# Patient Record
Sex: FEMALE
[De-identification: names, ages, dates, MRNs, and addresses within clinical notes are randomized per-mention and may not be internally consistent; named-entity substitution may affect disease eponyms.]

---

## 2019-04-10 ENCOUNTER — HOSPITAL ENCOUNTER (INPATIENT)
Dept: HOSPITAL 80 - BBEH | Age: 19
LOS: 2 days | Discharge: HOME | DRG: 882 | End: 2019-04-12
Attending: NURSE PRACTITIONER | Admitting: NURSE PRACTITIONER
Payer: COMMERCIAL

## 2019-04-10 DIAGNOSIS — F43.25: Primary | ICD-10-CM

## 2019-04-11 NOTE — ASMTBHMTP
Master Treatment Plan

 

Master Treatment Plan         Answers:  Depressed Mood with                   

for:                                    Suicidal Ideation                     

Date:                         04/11/2019

Diagnosis on Admission:       Borderline personality disorder

Expected length of stay:      3-5

Reason for admission:         

Notes:

Per [facility] admission information 4/7/2019: " The pt. is a 18 y.o. female who was 

admitted from [place] ED for acute delirium, hallucinations and anticholinergic side effects 

from intentional Benadryl overdose".

Patient's stated              

presenting problems:          

Notes:

"On Fri. I took 2500mg of Benadryl because I was fighting with my mother and boyfriend. I was 

stupid. It was impulsive".

Patient's goals for           

treatment:                    

Notes:

"Stay on the unit for 7 days, makes me think about how my actions affect the people around me".

Patient's strengths:          

Notes:

"I'm loving".

Identify supports outside     

of hospital:                  

Notes:

"My family, mom, dad, BF".

Discharge criteria:           

Notes:

Suicidal ideation will resolve and patient will have a plan to safely manage recurrent SI.

Initial disposition           

plan/considerations:          

Notes:

Participate in unit activities. Schedule follow up appointment with therapist.

Master Treatment Plan Required Signatures

 

Psychiatrist signature:       Answers:  ERIN Mark: __________________________

RN on-shift signature:        Answers:  RN: ____________________________________

Patient signature:            Answers:  Patient: ____________________________________

 

Date Signed:  04/11/2019 08:54 AM

Electronically Signed By:Tammie Murdock

## 2019-04-11 NOTE — ASMTCMCOM
CM Note

 

CM Note                       

Notes:

CC met with ct. to develop MTP. Ct. presented as pleasant, cooperative and cheerful. She reported 

that her SA was impulsive and that she took the Benadryl after fighting with MOC and BF. Ct. was 

remorseful and said that it was "a stupid act". She denied being suicidal. She signed a SRINATH for her 


therapist Debbie Arce.

 

Date Signed:  04/11/2019 11:29 AM

Electronically Signed By:Tammie Murdock

## 2019-04-11 NOTE — PDCONSULT
Consultant Note: 





INTERNAL MEDICINE CONSULT NOTE





DATE OF CONSULTATION: 4/11/2019





REASON FOR CONSULTATION: medical clearance for inpatient behavioral health stay





HISTORY OF PRESENT ILLNESS: Mrs Khan is a 19yo F who I was asked to see in the 

inpatient behavioral health unit for medical clearance. She was transferred to 

this facility from [facility] after an intentional overdose with 

benadryl. This was a suicide attempt. She was encephalopathic and had a 

anticholinergic toxidrome. Poison control was consulted who recommended 

conservative management. She improved and was stable for transfer here. She was 

reportedly having bizarre behavior (grandiose thought, stealing credit cards) 

recently, prompting her mother to bring her home from college. On my evaluation

, she is pleasant and without complaints. No recent fevers, chills, chest pain, 

dyspnea, abdominal pain, n/v/d, rashes. No recent medication changes or 

additions.





PAST MEDICAL HISTORY: none


PAST SURGICAL HISTORY: none


MEDICATIONS: none


ALLERGIES: nkda


SOCIAL HISTORY: Born in [place]. Lives with mother and stepfather. Has a sister. 

Smokes marijuana regularly. No alcohol. Occasional vape pen. Denies other 

illicits.


FAMILY HISTORY: non-contributory





REVIEW OF SYSTEMS: 10 point review was negative except per HPI.





VITALS: Reviewed. Afebrile, normal HR and BP.





PHYSICAL EXAM: No acute distress, comfortable. Appears stated age. Anicteric 

sclera, eomi, perrl. No thyromegaly. Clear oropharynx. RRR, no murmurs. Lungs 

clear bilaterally. Abdomen soft and nontender. No rashes. No leg edema or JVD. 

CN 2-12 intact, moving all extremities. Appropriate.





LABORATORY STUDIES: Beta-HCG negative. Reviewed labs from [facility] - negative 

tylenol level, negative salicylates, negative Utox. Serum creatinine 0.82. 

Initial labs at outside facility showed a potassium of 3.0, bicarbonate of 18, 

anion gap of 22, normal LFTs, WBC 11.4, hct 45.3, plts 415. ECG showed sinus 

tachycardia with a QTc of 434. 





ASSESSMENT/PLAN:


1. Psychiatric issues, suicide attempt, concern for borderline personality 

disorder: Management per psychiatry team.


2. Intentional diphenhydramine overdose with anticholinergic toxicity: Ingested 

prior to 4/7. Half life of benadryl is 8-10 hours so this should be out of 

system. Currently she does not have a toxidrome. She is urinating spontaneously 

on her own.


3. Anion gap metabolic acidosis: Due to acute intoxication. Resolved.


4. Hypokalemia: Resolved. She does not required regular blood draws to monitor 

her electrolytes unless there is concern for minimal PO intake.  





I see no medical contraindications for Ms Khan's continued stay in the inpatient 

behavioral health unit.





Thank you for this consult. Please do not hesitate to contact the internal 

medicine/hospitalist team if there should be further need for medical 

evaluation.

## 2019-04-11 NOTE — BAPA
[f 
rep st]



                                                  ADMISSION PSYCHIATRIC 
ASSESSMENT





DATE OF SERVICE:  04/11/2019



CHIEF COMPLAINT:  "Either Friday or Saturday I took 2500 mg of Benadryl due to 
a fight I had with my mom and my boyfriend."



HISTORY OF PRESENT ILLNESS:  The patient admitted involuntarily and is on an M1 
hold due to being a danger to herself, hospitalized for safety, crisis 
stabilization, and medication evaluation.  The patient describes circumstances 
led to current hospitalization as overdosing on Benadryl after getting in a 
fight with her boyfriend and her mother.  The patient reports no history of 
mental health illness or treatment.  The patient reports using no alcohol or 
substances prior to her overdose suicide attempt.  The patient describes no 
current psychiatric symptoms.  The patient reports arguing with her mom and 
boyfriend lead her to mood instability and conduct, and reports that she no 
longer has these feelings.  The patient reports this has been a "wake-up call" 
for her.  The patient reports no current psychiatric symptoms.  The patient 
denies psychiatric symptoms including symptoms of depression, lise, anxiety, 
ADHD, OCD, PTSD, psychosis, and any other symptom of psychiatric disorder.  The 
patient reports no history of abuse or trauma.  The patient describes attending 
to household responsibilities without difficulty.  Reports she is currently 
unemployed and plans to become a full-time student this fall.  The patient 
reports she does have a close group of friends and reports she gets along well 
with her parents.  The patient reports hobby as shopping.  The patient states 
she is generally satisfied with her life.  The patient denies current suicidal 
ideation, reports protective factors or reasons to live as her family, friends, 
and future.  The patient reports a future goal is to obtain a degree in finance 
or to do something that helps people.  The patient reports support network as 
family and friends.  Patient denies current homicidal ideation and denies 
current self-injurious ideation.  The patient was most recently established for 
therapy with Dr. Debbie Arce.  The patient reports she goes to Urgent Care for 
primary care needs.



PAST PSYCHIATRIC HISTORY:  The patient reports no past history of diagnoses and 
reports past psychotropic medication trials of trazodone and Adderall.  The 
patient reports she no longer takes these medications.  The patient reports no 
history of inpatient psychiatric treatment.  The patient describes no history 
of withdrawal from drugs or alcohol, and reports no history of suicide attempts
, and no history of self-injurious behavior.



ALLERGIES:  No known allergies.



CURRENT MEDICATIONS:  

1.  Tylenol 650 mg p.o. q.4 hours p.r.n. 

2.  Maalox syrup 30 mL p.o. q.6 hours p.r.n.

3.  Milk of magnesia 30 mL p.o. q. day. p.r.n.



PAST MEDICAL HISTORY:  The patient reports she has no reason to believe she 
could be pregnant, is currently not on birth control.  Reports she and her 
boyfriend do use protection.  The patient's urine pregnancy test at time of 
admission is negative.  The patient reports no history of neurological 
conditions including organic brain disease, traumatic brain injury, and 
concussions.  The patient reports no history of major illnesses or major 
hospitalizations.



SOCIAL HISTORY:  The patient reports that she was born in [place] and raised the 
majority of her life in [place] by her mom and step dad.  The patient reports 
she currently resides in [place], [place], with her mom and step dad.  The 
patient describes meeting all her developmental milestones.  Reports no history 
of learning delays or difficulties.  The patient describes her sexual 
orientation as heterosexual and reports she has been with her boyfriend for 2 
years.  The patient states she has never been , has no children, and is 
currently unemployed.  The patient's highest level of education is high school.
  The patient reports no history of  duty.  Describes Episcopal or 
spiritual practice as Tenriism, and reports no history of current legal 
charges or problems.



SUBSTANCE USE HISTORY:  Patient reports she drinks about once a month and 
drinks 1 drink per occasion.  The patient states that she uses a Juul multiple 
times per day.  The patient describes no other substance use history.



FAMILY PSYCHIATRIC HISTORY:  The patient reports no family history of mental 
illness, no family history of suicide, and no family history of substance use.



ADMISSION LABS AND STUDIES:  The patient's urine pregnancy test negative.



MENTAL STATUS EXAM:  The patient presents casually dressed and with good hygiene
, and looks stated age.  Patient is sitting, posture is upright, and position 
is relaxed.  Patient appears awake, alert, and responds appropriately and 
reasonably during interview.  Patient is engaged, relates well to interviewer, 
and emotional facial expression is appropriate to situation and changes 
appropriately with topic.  Patient is cooperative, makes comfortable eye contact
, and movements are voluntary, deliberate, coordinated, and smooth and even 
with no inappropriate movements.  Patient makes laryngeal sounds effortlessly 
and shares conversation appropriately; pace of conversation is appropriate, and 
stream of talking is fluent; articulation is clear and understandable; word 
choice is effortless and appropriate for education level; completes sentences, 
occasionally pausing to think; rate and volume are appropriate for interview 
and setting.  Patient reports mood as euthymic.  Patients affect is stable with 
full variable range, congruent with mood, and appropriate to speech and 
circumstances.  Patient has linear and logical thinking, with no loose 
associations, tangential thought, thought blocking, concrete thinking, or any 
other signs of formal thought disorder.  Patient denies suicidal and homicidal 
ideation, and denies hallucinations and delusions.  Patient appears to be a 
reliable historian with sound judgement and good insight into current 
condition.  Patient has no apparent dysfunction in recent or remote memory noted
, and no evidence of gross cognitive dysfunction noted at any point during the 
interview.



DIAGNOSIS:  Based on the patient's history and current presentation, patient's 
diagnosis is adjustment disorder with mixed disturbance of emotions and conduct.



FORMULATION:  The patient is a 18-year-old female, single, not , is 
currently in a relationship with a boyfriend of 2 years.  Reports she is 
currently unemployed and plans to go to school this fall.  The patient is 
currently living with her parents in [place], [place], and presents to the 
hospital involuntarily due to a risk to herself and is currently on an M1 hold.
  The patient requires continued inpatient care because of a recent suicide 
attempt by overdose of Benadryl.  The patient presents with problems of 
increased stressors due to argument with her mom and boyfriend that led to the 
patient taking an overdose of Benadryl.  The patient reports no past 
psychiatric history and no past history of suicide attempts.  The patient is a 
high suicide safety risk due to recent suicide attempt by overdose with 
Benadryl.  Protective factors while hospitalized include ongoing safety checks, 
active involvement in treatment and support from our treatment team.  Patient 
could benefit from inpatient hospitalization for safety and crisis 
stabilization.



PLAN:  

1.  Medications:  Will continue current medications listed above.  No 
psychotropic medications are indicated at this time and patient does not 
request a trial of psychotropic medication at this time.  No other medication 
changes at this time as more time is needed to determine ongoing tolerability 
and efficacy.  Plan is to continue to observe patient for response and side 
effects from medications, and ongoing monitoring and evaluation.  

2. Review with patient informed consent and recommendations for psychotropic 
medication treatment listed below

3. Labs: no additional labs at this time

4. Therapy: continue milieu and group therapy  

5. Further investigation including gathering information from patients 
relatives and review of past case records to inform treatment plan.

6. Safety/Wellness plan and follow-up outpatient appointments to be established 
prior to discharge.  Next steps are for patient to meet with care manager to 
plan a safe discharge plan and establish outpatient services for ongoing 
treatment.  

7. Confer with inpatient treatment team regarding treatment plan.  

8. Address psychosocial stressors by meeting with care coordinator to establish 
discharge plan including referrals for outpatient services.

9. Legal status: M1

10. Consider discharge tomorrow if patient is in stable condition, safe, and 
has a safe discharge plan.   



ESTIMATED LENGTH OF STAY: 1-3 days



PSYCHOTROPIC MEDICATION TREATMENT INFORMED CONSENT and RECOMMENDATIONS: Review 
nature of condition, diagnosis, and prognosis.  Review nature and purpose of 
psychotropic medication treatment.  Review type of psychotropic medications 
being ordered.  Review risk and benefits of psychotropic medication treatment.  
Review probable length of time will need to take medications.  Review risk and 
benefits of not undergoing psychotropic medication treatment.  Review 
alternative treatments to psychotropic medications.  Review psychotropic 
medications contraindications, drug-drug interactions, side effects, and 
importance of reporting any side effects to a psychiatric provider or nurse 
during inpatient hospitalization, and upon discharge to patients psychiatric 
outpatient provider, primary care provider, or other health care professional.  
Review importance of asking a nurse, psychiatric provider, or primary care 
provider any questions or problems concerning the psychotropic medications.  
Verify patient understands the information that has been provided, and 
understands, accepts, and agrees to psychotropic medications.  



Review patients safety plan and importance of patient to communicate to staff 
while hospitalized if patient is ever a danger to self/others, or unable to 
care for self, and upon discharge, the importance for patient to contact 
[facility] or [facility], or go to the nearest emergency room, if 
patient is ever a danger to self/others, or unable to care for self.  Recommend 
that upon discharge patient establish medication management treatment with a 
psychiatric provider, establishes routine therapy appointments, and follow-up 
with primary care provider.  Verify patient understands and agrees to these 
recommendations.







Job #:  [id number]

MTDD

## 2019-04-12 VITALS — DIASTOLIC BLOOD PRESSURE: 53 MMHG | SYSTOLIC BLOOD PRESSURE: 100 MMHG

## 2019-04-12 NOTE — BDS
[f 
rep st]



                                                  BEHAVIORAL HEALTH DISCHARGE 
SUMMARY





REASON FOR ADMISSION:  Patient admitted involuntarily on an M1 hold due to 
being a danger to herself, hospitalized for safety, crisis stabilization, and 
medication evaluation.  Patient describes circumstances that led to current 
hospitalization as overdosing on Benadryl after getting in a fight with her 
boyfriend and her mother.  Patient described arguing with her mother and 
boyfriend led to her mood instability and conduct, and at time of psychiatric 
evaluation, patient reported no longer having any psychiatric symptoms.



ADMITTING DIAGNOSIS:  Adjustment disorder with mixed disturbance of emotions 
and conduct.



ADMISSION PHYSICAL EXAM:  Patient was seen on 04/11/2019, for history and 
physical consultation for medical clearance for inpatient psychiatric 
hospitalization and treatment.  Patient was medically cleared for inpatient 
psychiatric hospitalization and treatment.  For further details, please refer 
to consultant note document dated 04/11/2019.



ADMISSION LABS:  Urine pregnancy test negative.



MAJOR PROCEDURES OR TESTS:  None.



HOSPITAL COURSE:  The most prominent symptoms and behaviors while the patient 
was here were reports of mild anxiety.  Treatment modalities utilized were 
milieu and group therapy.  Patient has improved considerably with no signs of 
psychiatric symptoms and no psychiatric symptoms expressed.  Patient reports 
she has improved since admission, states to be in stable condition, feels safe 
to discharge, and she contracts for safety.  Patients response to treatment was 
good.   There were no adverse or unexpected results of treatment.  The patient 
was safe throughout stay, active in treatment, engaged in groups, and was 
appropriate with staff.  Patient met with treatment team prior to discharge to 
assess readiness to discharge and review discharge plan.  The treatment team 
consensus is the patient in stable condition, has a safe discharge plan, and is 
ready to discharge today.  



CONDITION AT DISCHARGE:  Patient is in stable condition and is no longer a 
danger to self or others, and is not gravely disabled due to mental illness.  
Patient is no longer in need of inpatient level of care, and can be safely and 
effectively treated within the community.  The patients level of risk at time 
of discharge is low.  MSE: The patient is casually dressed and with good hygiene
, and looks stated age.  Patient is sitting, posture is upright, and position 
is relaxed.  Patient appears awake, alert, and responds appropriately and 
reasonably during interview.  Patient is engaged, relates well to interviewer, 
and emotional facial expression is appropriate to situation and changes 
appropriately with topic.  Patient is cooperative, makes comfortable eye contact
, and movements are voluntary, deliberate, coordinated, and smooth and even 
with no inappropriate movements.  Patient makes laryngeal sounds effortlessly 
and shares conversation appropriately; pace of conversation is appropriate, and 
stream of talking is fluent; articulation is clear and understandable; word 
choice is effortless and appropriate for education level; completes sentences, 
occasionally pausing to think; rate and volume are appropriate for interview 
and setting.  Patient reports mood as euthymic.  Patients affect is stable with 
full variable range, congruent with mood, and appropriate to speech and 
circumstances.  Patient has linear and logical thinking, with no loose 
associations, tangential thought, thought blocking, concrete thinking, or any 
other signs of formal thought disorder.  Patient denies suicidal and homicidal 
ideation, and denies hallucinations and delusions.  Patient appears to be a 
reliable historian with sound judgement and good insight into current 
condition.  Patient has no apparent dysfunction in recent or remote memory noted
, and no evidence of gross cognitive dysfunction noted at any point during the 
interview.



DISCHARGE DIAGNOSIS:  Adjustment disorder with mixed disturbance of emotions 
and conduct.



CURRENT MEDICATIONS:  None.



DISPOSITION:  Patient left hospital independently and voluntarily with her 
mother and plans to attend a therapy appointment today at 12 o'clock.  Patient'
s mother states she plans to drive patient to appointment.



FOLLOWUP:  Patient has a scheduled therapy appointment today at 1200.



LEGAL COURSE:  Patient was admitted on an M1 hold for involuntary psychiatric 
hospitalization and treatment.  Patient discharged today independently and 
voluntarily.



ATTITUDE AT TIME OF DISCHARGE:  The patients attitude was positive at time of 
discharge, and patient reports looking forward to discharging today.  The 
patient reports she feels safe to discharge, is no longer a danger to herself 
or others, is in stable condition, and contracts for safety.  



LABS AND STUDIES:  There were no pending labs or studies at time of discharge.



ADVANCE DIRECTIVES:  There were no advance directive on file, and patient was 
full code during this hospitalization.







Job #:  [id number]

MTDD

## 2019-04-16 NOTE — SOAPPROG
SOAP Progress Note


Assessment/Plan: 


Assessment:


























Plan:





Objective: 





 Vital Signs











Temp Pulse Resp BP Pulse Ox


 


 36.4 C   56 L  16   100/53 L  96 


 


 04/12/19 06:00  04/12/19 06:00  04/12/19 06:00  04/12/19 06:00  04/12/19 06:00








This NP contacted by patient's boyfriend, Yang.  [facility] requested patient's 

diagnosis and he stated, "Sometimes she lies, so I want to know what she was 

diagnosed with."  This NP provides no patient information.  Patient did sign 

SRINATH for [facility], however, purpose of SRINATH has been fulfilled.  This NP contacts 

patients psychologist, Dr. Debbie Arce, to let her know of the request made by 

[facility].  Patient was unable to be reached to let her know about her boyfriend 

requesting information.  This NP also tried to reach patient's mother, Sari

, however, was unable to reach her as well. 








ICD10 Worksheet


Patient Problems: 


 Problems











Problem Status Onset


 


Adjustment disorder with mixed disturbance of emotions and conduct Acute